# Patient Record
Sex: MALE | Race: WHITE | Employment: OTHER | URBAN - METROPOLITAN AREA
[De-identification: names, ages, dates, MRNs, and addresses within clinical notes are randomized per-mention and may not be internally consistent; named-entity substitution may affect disease eponyms.]

---

## 2020-01-11 ENCOUNTER — HOSPITAL ENCOUNTER (EMERGENCY)
Facility: CLINIC | Age: 25
Discharge: HOME | End: 2020-01-11
Attending: PHYSICIAN ASSISTANT | Admitting: PHYSICIAN ASSISTANT
Payer: COMMERCIAL

## 2020-01-11 VITALS
RESPIRATION RATE: 16 BRPM | DIASTOLIC BLOOD PRESSURE: 100 MMHG | OXYGEN SATURATION: 100 % | TEMPERATURE: 97.8 F | SYSTOLIC BLOOD PRESSURE: 131 MMHG

## 2020-01-11 DIAGNOSIS — S61.012A LACERATION OF LEFT THUMB WITHOUT FOREIGN BODY WITHOUT DAMAGE TO NAIL, INITIAL ENCOUNTER: ICD-10-CM

## 2020-01-11 PROCEDURE — 99282 EMERGENCY DEPT VISIT SF MDM: CPT

## 2020-01-11 PROCEDURE — 12001 RPR S/N/AX/GEN/TRNK 2.5CM/<: CPT

## 2020-01-11 RX ORDER — LIDOCAINE HYDROCHLORIDE AND EPINEPHRINE 10; 10 MG/ML; UG/ML
INJECTION, SOLUTION INFILTRATION; PERINEURAL
Status: DISCONTINUED
Start: 2020-01-11 | End: 2020-01-11 | Stop reason: HOSPADM

## 2020-01-11 ASSESSMENT — ENCOUNTER SYMPTOMS: WOUND: 1

## 2020-01-11 NOTE — ED AVS SNAPSHOT
[facility] Emergency Department  [address]  [facility] [address]  Phone:  [phone]  Fax:  [phone]                                    Danny Baer   MRN: [id number]    Department:  [facility] Emergency Department   Date of Visit:  1/11/2020           After Visit Summary Signature Page    I have received my discharge instructions, and my questions have been answered. I have discussed any challenges I see with this plan with the nurse or doctor.    ..........................................................................................................................................  Patient/Patient Representative Signature      ..........................................................................................................................................  Patient Representative Print Name and Relationship to Patient    ..................................................               ................................................  Date                                   Time    ..........................................................................................................................................  Reviewed by Signature/Title    ...................................................              ..............................................  Date                                               Time          22EPIC Rev 08/18

## 2020-01-11 NOTE — DISCHARGE INSTRUCTIONS
Watch the area surrounding the wound for signs of infection which can include increased redness, drainage, fevers, or swelling. Inspect the area daily. No swimming or baths for the next 3-5 days, showering is ok. See primary clinic in 10-14 days for stitches/staple removal. Apply antibacterial ointment such as bacitracin to the wound daily.     Discharge Instructions  Laceration (Cut)    You were seen today for a laceration (cut).  Your doctor examined your laceration for any problems such a buried foreign body (like glass, a splinter, or gravel), or injury to blood vessels, tendons, and nerves.  Your doctor may have also rinsed and/or scrubbed your laceration to help prevent an infection.  Your laceration may have been closed with glue, staples or sutures (stitches).      It may not be possible to find all problems with your laceration on the first visit, and we can't always prevent infections.  Antibiotics are only given when the benefit is more than the risk, and don't prevent all infections. Some lacerations are too high risk to close, and are left open to heal.  All lacerations, no matter how expertly repaired, will cause scarring.    Return to the Emergency Department right away if:  You have more redness, swelling, pain, drainage (pus), a bad smell, or red streaking from your laceration.    You have a fever of 101oF or more.  You have bleeding that you can t stop at home. If your cut starts to bleed, hold pressure on the bleeding area with a clean cloth or put pressure over the bandage.  If the bleeding doesn t stop after using constant pressure for 30 minutes, you should return to the Emergency Department for further treatment.  An area past the laceration is cool, pale, or blue compared with the other side, or has a slower return of color when squeezed.  Your dressing seems too tight or starts to get uncomfortable or painful.  You have loss of normal function or use of an area, such as being unable to  "straighten or bend a finger normally.  You have a numb area past the laceration.    Return to the Emergency Department or see your regular doctor if:  The laceration starts to come open.   You have something coming out of the cut or a feeling that there is something in the laceration.  Your wound will not heal, or keeps breaking open. There can always be glass, wood, dirt or other things in any wound.  They won t always show up, even on x-rays.  If a wound doesn t heal, this may be why, and it is important to follow-up with your regular doctor.    Home Care:  Take your dressing off in 12 hours, or as instructed by your doctor, to check your laceration. Remove the dressing sooner if it seems too tight or painful, or if it is getting numb, tingly, or pale past the dressing.  Gently wash your laceration 2 times a day with clean cloth and soap.   It is okay to shower, but do not let the laceration soak in water.    If your laceration was closed with wound adhesive or strips: pat it dry and leave it open to the air.   For all other repairs: after you wash your laceration, or at least 2 times a day, apply bacitracin or other antibiotic ointment to the laceration, then cover it with a Band-Aid  or gauze.  Keep the laceration clean. Wear gloves or other protective clothing if you are around dirt.    Follow-up:  You need to follow-up with your regular doctor in 10-14 days.  Your sutures or staples need to be removed in 10-14 days. Schedule an appointment with your regular doctor to have this done.    Scars:  To help minimize scarring:  Wear sunscreen over the healed laceration when out in the sun.  Massage the area regularly.  You may use Vitamin E oil.  Wait a year.  Most scars will start to fade within a year.    Probiotics: If you have been given an antibiotic, you may want to also take a probiotic pill or eat yogurt with live cultures. Probiotics have \"good bacteria\" to help your intestines stay healthy. Studies have " shown that probiotics help prevent diarrhea and other intestine problems (including C. diff infection) when you take antibiotics. You can buy these without a prescription in the pharmacy section of the store.     If you were given a prescription for medicine here today, be sure to read all of the information (including the package insert) that comes with your prescription.  This will include important information about the medicine, its side effects, and any warnings that you need to know about.  The pharmacist who fills the prescription can provide more information and answer questions you may have about the medicine.  If you have questions or concerns that the pharmacist cannot address, please call or return to the Emergency Department.     Opioid Medication Information    Pain medications are among the most commonly prescribed medicines, so we are including this information for all our patients. If you did not receive pain medication or get a prescription for pain medicine, you can ignore it.     You may have been given a prescription for an opioid (narcotic) pain medicine and/or have received a pain medicine while here in the Emergency Department. These medicines can make you drowsy or impaired. You must not drive, operate dangerous equipment, or engage in any other dangerous activities while taking these medications. If you drive while taking these medications, you could be arrested for DUI, or driving under the influence. Do not drink any alcohol while you are taking these medications.     Opioid pain medications can cause addiction. If you have a history of chemical dependency of any type, you are at a higher risk of becoming addicted to pain medications.  Only take these prescribed medications to treat your pain when all other options have been tried. Take it for as short a time and as few doses as possible. Store your pain pills in a secure place, as they are frequently stolen and provide a dangerous  opportunity for children or visitors in your house to start abusing these powerful medications. We will not replace any lost or stolen medicine.  As soon as your pain is better, you should flush all your remaining medication.     Many prescription pain medications contain Tylenol  (acetaminophen), including Vicodin , Tylenol #3 , Norco , Lortab , and Percocet .  You should not take any extra pills of Tylenol  if you are using these prescription medications or you can get very sick.  Do not ever take more than 3000 mg of acetaminophen in any 24 hour period.    All opioids tend to cause constipation. Drink plenty of water and eat foods that have a lot of fiber, such as fruits, vegetables, prune juice, apple juice and high fiber cereal.  Take a laxative if you don t move your bowels at least every other day. Miralax , Milk of Magnesia, Colace , or Senna  can be used to keep you regular.      Remember that you can always come back to the Emergency Department if you are not able to see your regular doctor in the amount of time listed above, if you get any new symptoms, or if there is anything that worries you.

## 2020-01-11 NOTE — ED PROVIDER NOTES
History     Chief Complaint:  Laceration     HPI   Danny Mir Baer is an ambidextrous 24 year old male who presents with laceration to his left thumb. Last tetanus 2015, per [facility]. The patient explains that he was cutting a zip tie when he accidentally cut his left thumb with a clean knife just prior to arrival. He feels like his thumb is weak. He rinsed with hot water and then peroxide.     Allergies:  No known drug allergies    Medications:    Adderall  Seroquel    Past Medical History:    ADHD  Bipolar 1 disorder    Past Surgical History:    Tympanoplasty  Tonsillectomy and adenoidectomy  Family History:    ADHD  Hyperlipidemia  Hypertension  Thyroid disorder    Social History:  Smoking status: Current every day smoker  Alcohol use: Yes  Drug use: No  Presents to the ED with his grandfather  Up to date on immunizations    Review of Systems   Skin: Positive for wound.   All other systems reviewed and are negative.    Physical Exam     Patient Vitals for the past 24 hrs:   BP Temp Temp src Heart Rate Resp SpO2   01/11/20 1242 -- -- -- -- 16 --   01/11/20 1151 -- 97.8  F (36.6  C) -- -- -- --   01/11/20 1150 (!) 131/100 -- Oral 99 16 100 %     Physical Exam  General: Well appearing, well nourished. Normal mood and affect.  Skin: Approximately 2 centimeter laceration to the left thumb, webspace.  No visualized tendon, bony, nerve, vascular damage.  HEENT: Head: Normocephalic, atraumatic, no visible masses.   Eyes: Conjunctiva clear.  Cardiac: Normal rate and regular rhythm, no murmur or gallop.   Lungs: Clear to auscultation.   Musculoskeletal: Normal gait and station.   Left Hand: Full flexion extension of wrist without pain or difficulty. The finger flexors (FDS/FDP) and extensors are intact. Able to make okay sign, thumbs up, peace sign and cross fingers.  The thumb exam is normal, including: Adduction, abduction, flexion, extension, opposition. There are no sensory deficits, Median, Ulnar, and Radial nerve  function is normal. Radial artery pulsations are normal. Capillary refill is normal.   Neurologic: Oriented x 3. GCS: 15.  Psychiatric: Intact recent and remote memory, judgment and insight, normal mood and affect.     Emergency Department Course   Procedures:    Laceration Repair        LACERATION:  A simple clean 2 cm laceration.      LOCATION:  Left thumb      ANESTHESIA:  Local using 1% Lidocaine with epinephrine using a total of 2 mLs      PREPARATION:  Irrigation with Normal Saline      DEBRIDEMENT:  no debridement      CLOSURE:  Wound was closed with One Layer.  Skin closed with 7 x 5.0 Ethylon sutures.     Emergency Department Course:  Past medical records, nursing notes, and vitals reviewed.  1154: I performed an exam of the patient and obtained history, as documented above.    1220: I performed a laceration repair on the patient, procedure note above.    Findings and plan explained to the patient. Patient discharged home with instructions regarding supportive care, medications, and reasons to return. The importance of close follow-up was reviewed.     Impression & Plan   Medical Decision Making:  Danny Baer is a 24 year old male presented to the ED today for evaluation of a laceration.  Details of the patient's history can be noted in the HPI.  The wound was carefully explored and evaluated.  The laceration was closed as noted in the procedure note above.  There was no evidence of muscular, tendon, bone, or nerve damage with this laceration.  There is no evidence of foreign body.  Possible complications such as infection and scarring were reviewed with the patient.  They will return to the ED for any signs of increased redness, streaking, drainage, fevers, new concerns.  They will apply bacitracin daily.  Additional suture care was discussed they will follow-up with her primary care provider or another medical facility and provided in the discharge paperwork. Suture removal in 10-14 days. Tetanus  is up-to-date. All questions were answered prior to the patient's discharge.  They were in agreement with the treatment plan as stated above.    Discharge Diagnosis:    ICD-10-CM   1. Laceration of left thumb without foreign body without damage to nail, initial encounter S61.012A       Disposition: Discharged to home.     IKaitlyn, am serving as a scribe at 11:54 AM on 1/11/2020 to document services personally performed by Verónica Page PA based on my observations and the provider's statements to me.     This was created at least in part with a voice recognition software. Mistakes/typos may be present.      Verónica Page PA  01/11/20 1622

## 2020-05-19 ENCOUNTER — OFFICE VISIT (OUTPATIENT)
Dept: URGENT CARE | Facility: URGENT CARE | Age: 25
End: 2020-05-19
Payer: COMMERCIAL

## 2020-05-19 VITALS
TEMPERATURE: 98.6 F | DIASTOLIC BLOOD PRESSURE: 92 MMHG | HEART RATE: 77 BPM | SYSTOLIC BLOOD PRESSURE: 130 MMHG | OXYGEN SATURATION: 99 %

## 2020-05-19 DIAGNOSIS — S81.811A LACERATION OF LOWER LEG, RIGHT, INITIAL ENCOUNTER: Primary | ICD-10-CM

## 2020-05-19 DIAGNOSIS — R03.0 ELEVATED BLOOD PRESSURE READING WITHOUT DIAGNOSIS OF HYPERTENSION: ICD-10-CM

## 2020-05-19 PROCEDURE — 12001 RPR S/N/AX/GEN/TRNK 2.5CM/<: CPT | Performed by: FAMILY MEDICINE

## 2020-05-19 PROCEDURE — 99203 OFFICE O/P NEW LOW 30 MIN: CPT | Mod: 25 | Performed by: FAMILY MEDICINE

## 2020-05-19 RX ORDER — QUETIAPINE FUMARATE 100 MG/1
TABLET, FILM COATED ORAL
COMMUNITY
Start: 2020-03-10

## 2020-05-19 RX ORDER — LITHIUM CARBONATE 300 MG/1
TABLET, FILM COATED, EXTENDED RELEASE ORAL
COMMUNITY
Start: 2020-03-10

## 2020-05-19 RX ORDER — QUETIAPINE FUMARATE 50 MG/1
100 TABLET, FILM COATED ORAL
COMMUNITY
Start: 2019-02-15

## 2020-05-20 NOTE — PROGRESS NOTES
Subjective:   Danny Baer is a 24 year old male who presents for   Chief Complaint   Patient presents with     Urgent Care     Laceration     Right below right knee area-laceration 20-1hra ago. Outside records-tdap 3/2/2015     Axe injury when cutting wood, the blade slipped off the wood and came into his right lower leg, small amount of bleeding. Able to walk without difficulty. Has not yet cleaned out wound. No other injuries to the body. Had an incident a year ago to his hand that required stitches, this made him lightheaded at the time  No loss of sensation.   Last TDAP: outside records reports he has had this done just a little over 5 years ago      Has been told he has elevated BP previously, last check tho was during an episode of discomfort (suture of hand) - family history of high blood pressure    There are no active problems to display for this patient.      Current Outpatient Medications   Medication     lithium ER (LITHOBID) 300 MG CR tablet     QUEtiapine (SEROQUEL) 100 MG tablet     QUEtiapine (SEROQUEL) 50 MG tablet     No current facility-administered medications for this visit.        ROS:  As above per HPI    Objective:   BP (!) 130/92 (BP Location: Right arm, Patient Position: Sitting, Cuff Size: Adult Large)   Pulse 77   Temp 98.6  F (37  C) (Tympanic)   SpO2 99% , There is no height or weight on file to calculate BMI.  Gen:  NAD, well-nourished, sitting in chair comfortably  HEENT: EOMI, sclera anicteric, Head normocephalic, ; nares patent; moist mucous membranes  Neck: trachea midline, no thyromegaly  CV:  Hemodynamically stable  Pulm:  no increased work of breathing   Extrem: no cyanosis, edema or clubbing  Skin: no obvious rashes or abnormalities  Psych: Euthymic, linear thoughts, normal rate of speech  R lower le.5cm diagonal laceration approximately 2-3mm in depth          No results found for any visits on 20.    Assessment & Plan:   Danny Baer, 24 year  old male who presents with:  Laceration of lower leg, right, initial encounter  Wound was cleaned with saline/chlorhexidine by MA, 1mL of 1% lido with epi was used to anesthetize the area, good anesthesia achieved.   Three (3) simple interrupted sutures were used using 4-0 Ethilon (black color)    Care tips included in AVS      Elevated blood pressure reading without diagnosis of hypertension  Patient advised to f/u with PCP to re-check blood pressure given his multiple readings of elevated BP, appears his diastolic pressure has historically run in the 90-100s.       Rosales Ambrocio MD   [facility]    The use of Dragon/BringMeTheNews dictation services may have been used to construct the content in this note; any grammatical or spelling errors are non-intentional. Please contact the author of this note directly if you are in need of any clarification.

## 2020-05-20 NOTE — PATIENT INSTRUCTIONS
Return in 8-10 days to have your stitches/staples removed. Call ahead [facility] for a 'suture removal' visit at one of our [facility] sites ( no charge if you return to [facility], outside organizations may charge you for an evaluation fee if you go there instead)    Avoid soaking/submerging the area for 2 days. Okay to change the bandage we placed tomorrow afternoon. Change bandage/dressing at least once daily.   Keep the area moisturized with bacitracin or vaseline.     If you develop increasing pain/discomfort/swelling/redness/warmth, a fever or drainage of pus please return right away to be re-evaluated as this could be a sign of infection    Important to use SPF 30 or higher on this area of the body for the next year when out in the sun (burning of the skin can worsen scarring)

## 2025-03-16 ENCOUNTER — OFFICE VISIT (OUTPATIENT)
Dept: URGENT CARE | Facility: URGENT CARE | Age: 30
End: 2025-03-16
Payer: COMMERCIAL

## 2025-03-16 VITALS
OXYGEN SATURATION: 98 % | TEMPERATURE: 98 F | DIASTOLIC BLOOD PRESSURE: 87 MMHG | SYSTOLIC BLOOD PRESSURE: 142 MMHG | RESPIRATION RATE: 14 BRPM | HEART RATE: 90 BPM | WEIGHT: 208 LBS

## 2025-03-16 DIAGNOSIS — I80.8 SUPERFICIAL THROMBOPHLEBITIS OF RIGHT UPPER EXTREMITY: Primary | ICD-10-CM

## 2025-03-16 PROCEDURE — 3079F DIAST BP 80-89 MM HG: CPT | Performed by: PHYSICIAN ASSISTANT

## 2025-03-16 PROCEDURE — 3077F SYST BP >= 140 MM HG: CPT | Performed by: PHYSICIAN ASSISTANT

## 2025-03-16 PROCEDURE — 1125F AMNT PAIN NOTED PAIN PRSNT: CPT | Performed by: PHYSICIAN ASSISTANT

## 2025-03-16 PROCEDURE — 99203 OFFICE O/P NEW LOW 30 MIN: CPT | Performed by: PHYSICIAN ASSISTANT

## 2025-03-16 RX ORDER — GABAPENTIN 300 MG/1
300 CAPSULE ORAL
COMMUNITY
Start: 2025-03-11

## 2025-03-16 RX ORDER — GABAPENTIN 100 MG/1
100 CAPSULE ORAL
COMMUNITY
Start: 2025-03-11

## 2025-03-16 ASSESSMENT — PAIN SCALES - GENERAL: PAINLEVEL_OUTOF10: MILD PAIN (2)

## 2025-03-16 NOTE — PROGRESS NOTES
Patient presents with:  swelling, lesion : Rt anti cubital region.        Clinical Decision Making:  Ddx: superficial thrombophlebitis, cellulitis, lymphangitis, DVT. Low janine for DVT as this is palpable and clearly a superficial vein. No streaking concerning for lymphangitis. Recommend supportive cares. Patient reassured.       ICD-10-CM    1. Superficial thrombophlebitis of right upper extremity  I80.8           Patient Instructions   Take Ibuprofen 600 mg every 6 hours.   Cool compresses.   Try to avoid further trauma.   Follow up if edema or more generalized arm pain occurs.     HPI:  Danny Baer is a 29 year old male who presents today with concerns of right anticubital swelling that started last week 4 days ago.  He is an  and has to sharpen saws. The following day after sharpening saws he noted redness and a lump over the vein. Swelling started getting better. Redness was subsiding. Today he noted a deeper red retuned and pain has returned. [place]th[place] feeling well and healthy.     History obtained from the patient.    Problem List:  There are no relevant problems documented for this patient.      No past medical history on file.    Social History     Tobacco Use    Smoking status: Every Day     Passive exposure: Current    Smokeless tobacco: Never   Substance Use Topics    Alcohol use: Not on file         Review of Systems    Vitals:    03/16/25 1822 03/16/25 1828   BP: (!) 147/84 (!) 142/87   BP Location: Left arm Left arm   Patient Position: Sitting Sitting   Pulse: 90    Resp: 14    Temp: 98  F (36.7  C)    TempSrc: Oral    SpO2: 98%    Weight: 94.3 kg (208 lb)        Physical Exam  Vitals and nursing note reviewed.   Constitutional:       General: He is not in acute distress.     Appearance: He is not toxic-appearing or diaphoretic.   HENT:      Head: Normocephalic and atraumatic.   Eyes:      Conjunctiva/sclera: Conjunctivae normal.   Pulmonary:      Effort: Pulmonary effort is normal.    Musculoskeletal:        Arms:    Neurological:      Mental Status: He is alert.   Psychiatric:         Mood and Affect: Mood normal.         Behavior: Behavior normal.         Thought Content: Thought content normal.         Judgment: Judgment normal.           At the end of the encounter, I discussed results, diagnosis, medications. Discussed red flags for immediate return to clinic/ER, as well as indications for follow up if no improvement. Patient understood and agreed to plan. Patient was stable for discharge.

## 2025-03-16 NOTE — PATIENT INSTRUCTIONS
Take Ibuprofen 600 mg every 6 hours.   Cool compresses.   Try to avoid further trauma.   Follow up if edema or more generalized arm pain occurs.